# Patient Record
Sex: MALE | Race: WHITE | Employment: FULL TIME | URBAN - METROPOLITAN AREA
[De-identification: names, ages, dates, MRNs, and addresses within clinical notes are randomized per-mention and may not be internally consistent; named-entity substitution may affect disease eponyms.]

---

## 2019-10-18 ENCOUNTER — APPOINTMENT (OUTPATIENT)
Dept: GENERAL RADIOLOGY | Facility: HOSPITAL | Age: 44
End: 2019-10-18
Attending: EMERGENCY MEDICINE
Payer: COMMERCIAL

## 2019-10-18 ENCOUNTER — APPOINTMENT (OUTPATIENT)
Dept: CV DIAGNOSTICS | Facility: HOSPITAL | Age: 44
End: 2019-10-18
Attending: EMERGENCY MEDICINE
Payer: COMMERCIAL

## 2019-10-18 ENCOUNTER — HOSPITAL ENCOUNTER (EMERGENCY)
Facility: HOSPITAL | Age: 44
Discharge: HOME | End: 2019-10-18
Attending: EMERGENCY MEDICINE
Payer: COMMERCIAL

## 2019-10-18 VITALS
SYSTOLIC BLOOD PRESSURE: 128 MMHG | TEMPERATURE: 98 F | RESPIRATION RATE: 18 BRPM | WEIGHT: 260 LBS | HEART RATE: 78 BPM | BODY MASS INDEX: 32 KG/M2 | OXYGEN SATURATION: 96 % | DIASTOLIC BLOOD PRESSURE: 86 MMHG

## 2019-10-18 DIAGNOSIS — R07.89 CHEST PAIN, ATYPICAL: Primary | ICD-10-CM

## 2019-10-18 PROCEDURE — 93350 STRESS TTE ONLY: CPT | Performed by: EMERGENCY MEDICINE

## 2019-10-18 PROCEDURE — 99285 EMERGENCY DEPT VISIT HI MDM: CPT

## 2019-10-18 PROCEDURE — 93010 ELECTROCARDIOGRAM REPORT: CPT

## 2019-10-18 PROCEDURE — 93005 ELECTROCARDIOGRAM TRACING: CPT

## 2019-10-18 PROCEDURE — 85025 COMPLETE CBC W/AUTO DIFF WBC: CPT | Performed by: EMERGENCY MEDICINE

## 2019-10-18 PROCEDURE — 93018 CV STRESS TEST I&R ONLY: CPT | Performed by: EMERGENCY MEDICINE

## 2019-10-18 PROCEDURE — 80053 COMPREHEN METABOLIC PANEL: CPT | Performed by: EMERGENCY MEDICINE

## 2019-10-18 PROCEDURE — 85379 FIBRIN DEGRADATION QUANT: CPT | Performed by: EMERGENCY MEDICINE

## 2019-10-18 PROCEDURE — 84484 ASSAY OF TROPONIN QUANT: CPT | Performed by: EMERGENCY MEDICINE

## 2019-10-18 PROCEDURE — 71045 X-RAY EXAM CHEST 1 VIEW: CPT | Performed by: EMERGENCY MEDICINE

## 2019-10-18 PROCEDURE — 93017 CV STRESS TEST TRACING ONLY: CPT | Performed by: EMERGENCY MEDICINE

## 2019-10-18 PROCEDURE — 36415 COLL VENOUS BLD VENIPUNCTURE: CPT

## 2019-10-18 NOTE — ED PROVIDER NOTES
Patient Seen in: [facility] Emergency Department      History   Patient presents with:  Chest Pain Angina (cardiovascular)    Stated Complaint: cp    HPI    Patient has a history of severe mitral regurg and marked prolapse of the posterior mitral va History:   Diagnosis Date   • Deep vein thrombosis (DVT) of right lower extremity ([address] 2007   • Heart murmur 11/5/2015 - 2016    resolved with Mitral Valve Repair 6/20/16 at [facility]. sees Dr. Mariam Pelaez for cardio.     • High blood pressure     resolved with without murmur or rub. Distal pulses are strong and symmetric. Abdomen: Soft, nondistended. Completely nontender, even with deep palpation left upper quadrant  Extremities: Unremarkable. Calves nonswollen, symmetric, nontender. No pedal edema.   Skin: with flattened T waves in the inferior leads but no extraordinary ST change to suggest ischemia or infarct                  MDM     Patient reports being pretty much pain-free  He has already received 4 baby aspirin  He was closely monitored    CBC shows n

## 2019-10-18 NOTE — ED NOTES
Patient states of left sided chest pain  intermittent he woke up this way pain is heavy,achy.  He panicked because of his previous heart condition

## 2019-10-18 NOTE — PROGRESS NOTES
PRELIMINARY CARDIODIAGNOSTICS REPORT    No ST segment changes noted with exercise. No symptoms or arrhythmias. Pt walked for 7:17 on Tez protocol achieving MHR of 148 (85% APMHR) and peak BP of 184/82. Test was terminated due to fatigue. Echo pending.

## 2019-10-18 NOTE — ED INITIAL ASSESSMENT (HPI)
44YM c/c of chest pain pt state that he awoke today around 5ish with chest pain Pt state that pain came and went.  Pt state that he now having L side chest heavenliness

## 2022-01-26 PROBLEM — M25.562 CHRONIC PAIN OF LEFT KNEE: Status: ACTIVE | Noted: 2022-01-26

## 2022-01-26 PROBLEM — G89.29 CHRONIC PAIN OF LEFT KNEE: Status: ACTIVE | Noted: 2022-01-26

## 2022-01-26 PROBLEM — G47.33 OSA ON CPAP: Status: ACTIVE | Noted: 2022-01-26

## 2022-01-26 PROBLEM — E78.00 HYPERCHOLESTEREMIA: Status: ACTIVE | Noted: 2022-01-26

## 2022-01-26 PROBLEM — Z99.89 OSA ON CPAP: Status: ACTIVE | Noted: 2022-01-26

## (undated) NOTE — ED AVS SNAPSHOT
Norman Daugherty   MRN: [id number]    Department:  [facility] Emergency Department   Date of Visit:  10/18/2019           Disclosure     Insurance plans vary and the physician(s) referred by the ER may not be covered by your plan.  Please contact your tell this physician (or your personal doctor if your instructions are to return to your personal doctor) about any new or lasting problems. The primary care or specialist physician will see patients referred from the [facility] Emergency Department.  Cheryle Levins